# Patient Record
Sex: MALE | Race: WHITE | NOT HISPANIC OR LATINO | URBAN - METROPOLITAN AREA
[De-identification: names, ages, dates, MRNs, and addresses within clinical notes are randomized per-mention and may not be internally consistent; named-entity substitution may affect disease eponyms.]

---
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
## 2025-05-08 ENCOUNTER — COMPREHENSIVE EXAM (OUTPATIENT)
Age: 20
End: 2025-05-08

## 2025-05-08 DIAGNOSIS — H53.2: ICD-10-CM

## 2025-05-08 DIAGNOSIS — H10.45: ICD-10-CM

## 2025-05-08 DIAGNOSIS — H52.03: ICD-10-CM

## 2025-05-08 DIAGNOSIS — H04.123: ICD-10-CM

## 2025-05-08 PROCEDURE — 92015 DETERMINE REFRACTIVE STATE: CPT

## 2025-05-08 PROCEDURE — 92014 COMPRE OPH EXAM EST PT 1/>: CPT

## 2025-05-28 ENCOUNTER — CONTACT LENSES/GLASSES VISIT (OUTPATIENT)
Age: 20
End: 2025-05-28

## 2025-05-28 DIAGNOSIS — H52.03: ICD-10-CM

## 2025-05-28 PROCEDURE — 92310-4 LEVEL 4 NEW SOFT LENS: Mod: 21
